# Patient Record
(demographics unavailable — no encounter records)

---

## 2025-01-11 NOTE — PHYSICAL EXAM
[Chaperone Present] : A chaperone was present in the examining room during all aspects of the physical examination [[id number]] : A chaperone was present during the pelvic exam. [Appropriately responsive] : appropriately responsive [Alert] : alert [No Acute Distress] : no acute distress [Soft] : soft [Non-tender] : non-tender [Non-distended] : non-distended [No Mass] : no mass [Oriented x3] : oriented x3 [Examination Of The Breasts] : a normal appearance [No Masses] : no breast masses were palpable [Labia Majora] : normal [Labia Minora] : normal [Discharge] : a  ~M vaginal discharge was present [Moderate] : moderate [White] : white [Thick] : thick [Normal] : normal [Uterine Adnexae] : non-palpable [FreeTextEntry2] : MAXIMILIAN Caballero  [Tenderness] : nontender

## 2025-01-11 NOTE — HISTORY OF PRESENT ILLNESS
[Menarche Age ____] : age at menarche was [unfilled] [Menarche Age: ____] : age at menarche was [unfilled] [No] : Patient does not have concerns regarding sex [N] : Patient does not use contraception [Y] : Patient is sexually active [LMPDate] : 01/03/25 [PGHxTotal] : 0 [FreeTextEntry1] : 01/03/25

## 2025-01-11 NOTE — PLAN
[FreeTextEntry1] : -F/u pap -Rx DARLING renewed with correct use reviewed, back up contraception for 7 days since there has been a lapse in coverage  -Recommended dermatology for routine, annual skin survey -Routine physical activity encouraged -RTO one year or sooner PRN for any new problems, questions or concerns

## 2025-03-31 NOTE — DISCUSSION/SUMMARY
[FreeTextEntry1] : Pt advised pending results any further tx.  Safe sex practices reviewed.  All the pt's questions/concerns were addressed.

## 2025-03-31 NOTE — PHYSICAL EXAM
[Chaperone Present] : A chaperone was present in the examining room during all aspects of the physical examination [Appropriately responsive] : appropriately responsive [Alert] : alert [No Acute Distress] : no acute distress [Oriented x3] : oriented x3 [No Lesions] : no lesions  [Labia Minora] : normal [Labia Majora] : normal [Pink Rugae] : pink rugae [No Bleeding] : There was no active vaginal bleeding [Normal] : normal [Normal Position] : in a normal position [Uterine Adnexae] : non-palpable [FreeTextEntry4] : moderate amount of white/grayish discharge - cultures preformed, vault irrigated with H2O2.

## 2025-03-31 NOTE — HISTORY OF PRESENT ILLNESS
[Oral Contraceptive] : uses oral contraception pills [Y] : Patient is sexually active [N] : Patient denies prior pregnancies [Menarche Age: ____] : age at menarche was [unfilled] [No] : Patient does not have concerns regarding sex [Currently Active] : currently active [Men] : men [PapSmeardate] : 1/11/25 [TextBox_31] : NEG [HPVDate] : 1/11/25 [TextBox_78] : NEG [LMPDate] : 03/03/25 [FreeTextEntry1] : 03/03/25